# Patient Record
Sex: FEMALE | Race: WHITE
[De-identification: names, ages, dates, MRNs, and addresses within clinical notes are randomized per-mention and may not be internally consistent; named-entity substitution may affect disease eponyms.]

---

## 2019-12-20 ENCOUNTER — HOSPITAL ENCOUNTER (OUTPATIENT)
Dept: HOSPITAL 61 - MRI | Age: 54
Discharge: HOME | End: 2019-12-20
Payer: COMMERCIAL

## 2019-12-20 DIAGNOSIS — Z98.51: ICD-10-CM

## 2019-12-20 DIAGNOSIS — Z90.49: ICD-10-CM

## 2019-12-20 DIAGNOSIS — M48.07: ICD-10-CM

## 2019-12-20 DIAGNOSIS — M51.27: Primary | ICD-10-CM

## 2019-12-20 DIAGNOSIS — Z90.710: ICD-10-CM

## 2019-12-20 DIAGNOSIS — M51.37: ICD-10-CM

## 2019-12-20 DIAGNOSIS — M25.78: ICD-10-CM

## 2019-12-20 PROCEDURE — 72148 MRI LUMBAR SPINE W/O DYE: CPT

## 2019-12-20 NOTE — RAD
MRI Lumbar Spine without contrast

 

History: Low back pain, bilateral leg radiculopathy

 

Technique: Multiplanar, multi sequential noncontrast MR imaging was 

performed of the lumbar spine.

 

Comparison: None

 

Findings: 

Lumbar vertebral body stature and AP alignment are maintained. Conus 

terminates near L1. There is moderate L5-S1 degenerative disc disease, 

associated degenerative endplate change. There is mild disc desiccation 

L3-4 and L4-5. There is degenerative change of the anterior superior and 

inferior corners of L2 and also inferiorly of L1 and L3. There are small 

hemangiomas of the L1 and T11 vertebral bodies. There are anterior annular

tears L3-4 and L4-5. There is very mild edema of the anterior, superior 

corner of L4 and also the anterior, inferior corner of L3.

 

L1-L2:  This level was not included on the axial images. Spinal canal and 

neural foramina are adequate.

 

L2-L3:  Spinal canal and neural foramina are adequate.

 

L3-L4:  There is mild prominence of posterior epidural fat centrally. 

There is minimal buckling of the ligamentum flavum. Spinal canal and 

neural foramina are adequate.

 

L4-L5:  There is prominence of posterior epidural fat, also minimally in 

the lateral recesses bilaterally. There is mild buckling of the ligamentum

flavum and facet hypertrophic change. There is very mild narrowing of the 

inferior right neural foramen by minimal disc osteophyte complex, left 

neural foramen adequate. There is overall mild attenuation of the thecal 

sac from epidural lipomatosis, somewhat limited preserved subarachnoid 

space. Epidural lipomatosis also results in attenuation of the thecal sac 

at the mid aspect of L4 and L5.

 

L5-S1:  There is minimal disc osteophyte complex and shallow protrusion 

near the descending S1 nerve roots greater on the left without 

displacement or significant spinal stenosis. There is circumferential 

prominence of epidural fat with limited preserved subarachnoid space. 

There is minimal narrowing of the inferior right neural foramen by minimal

disc osteophyte complex and shallow bulge/protrusion, left neural foramen 

adequate.

 

Impression: 

 

1.  There is moderate L5-S1 degenerative disc disease. Epidural 

lipomatosis results in degree of attenuation of the thecal sac most 

notable L4 into the sacrum. Shallow protrusion at L5-S1 is near the 

descending S1 nerve roots without significant impingement. There is mild 

narrowing of the right L4-5 and L5-S1 neural foramina.

 

Electronically signed by: Gabriel Gonsalez MD (12/20/2019 9:28 AM) 

[id number]